# Patient Record
Sex: MALE | Race: WHITE | NOT HISPANIC OR LATINO | URBAN - METROPOLITAN AREA
[De-identification: names, ages, dates, MRNs, and addresses within clinical notes are randomized per-mention and may not be internally consistent; named-entity substitution may affect disease eponyms.]

---

## 2018-06-14 ENCOUNTER — HISTORICAL (OUTPATIENT)
Dept: RADIOLOGY | Facility: HOSPITAL | Age: 38
End: 2018-06-14

## 2019-04-25 ENCOUNTER — HISTORICAL (OUTPATIENT)
Dept: RADIOLOGY | Facility: HOSPITAL | Age: 39
End: 2019-04-25

## 2019-04-30 ENCOUNTER — HISTORICAL (OUTPATIENT)
Dept: SURGERY | Facility: HOSPITAL | Age: 39
End: 2019-04-30

## 2019-05-09 ENCOUNTER — HISTORICAL (OUTPATIENT)
Dept: ADMINISTRATIVE | Facility: HOSPITAL | Age: 39
End: 2019-05-09

## 2019-05-30 ENCOUNTER — HISTORICAL (OUTPATIENT)
Dept: ADMINISTRATIVE | Facility: HOSPITAL | Age: 39
End: 2019-05-30

## 2019-06-27 ENCOUNTER — HISTORICAL (OUTPATIENT)
Dept: ADMINISTRATIVE | Facility: HOSPITAL | Age: 39
End: 2019-06-27

## 2022-04-10 ENCOUNTER — HISTORICAL (OUTPATIENT)
Dept: ADMINISTRATIVE | Facility: HOSPITAL | Age: 42
End: 2022-04-10

## 2022-04-29 VITALS
BODY MASS INDEX: 35.64 KG/M2 | WEIGHT: 227.06 LBS | BODY MASS INDEX: 35.64 KG/M2 | BODY MASS INDEX: 35.64 KG/M2 | DIASTOLIC BLOOD PRESSURE: 94 MMHG | HEIGHT: 67 IN | HEIGHT: 67 IN | SYSTOLIC BLOOD PRESSURE: 138 MMHG | HEIGHT: 67 IN | HEIGHT: 67 IN | WEIGHT: 227.06 LBS | SYSTOLIC BLOOD PRESSURE: 167 MMHG | DIASTOLIC BLOOD PRESSURE: 80 MMHG | SYSTOLIC BLOOD PRESSURE: 138 MMHG | DIASTOLIC BLOOD PRESSURE: 93 MMHG | SYSTOLIC BLOOD PRESSURE: 150 MMHG | BODY MASS INDEX: 35.64 KG/M2 | DIASTOLIC BLOOD PRESSURE: 94 MMHG | WEIGHT: 227.06 LBS | WEIGHT: 227.06 LBS

## 2022-04-30 NOTE — OP NOTE
DATE OF SURGERY:    04/30/2019    SURGEON:  Bruno Amin MD    PREOPERATIVE DIAGNOSIS:  Right 4th metacarpal shaft fracture.    POSTOPERATIVE DIAGNOSIS:  Right 4th metacarpal shaft fracture.    PROCEDURE:    1. Open reduction/internal fixation, right 4th metacarpal shaft fracture.    2. Application of short-arm splint, right upper extremity.    ANESTHESIA:  LMA.    IV FLUIDS:  As per Anesthesia.    ESTIMATED BLOOD LOSS:  Less than 15 cc.    COUNTS:  Correct.    COMPLICATIONS:  None.    IMPLANTS:  Biomet 1.5 plate and screws.    DISPOSITION:  Stable to PACU.    INDICATION FOR PROCEDURE:  Mr. Arellano is a 38-year-old male who had trauma to his right hand.  He has been followed in my clinic.  Patient was noted to have a complete, slightly comminuted 4th metacarpal shaft fracture.  The risks, benefits, and alternatives were discussed with the patient in detail.  The risks included, but were not limited to, pain, bleeding, infection, damage to blood vessels or nerves, heart attack, stroke, death, need for operation, continued pain, continued loss of motion, malunion, nonunion, painful hardware, hardware failure, wound healing complications, need for additional surgery.  The patient understood these risks and wanted to proceed with surgical intervention.  Informed consent was obtained.    PROCEDURE IN DETAIL:  The patient was found in preoperative holding by Anesthesia and found fit for surgery.  He was taken to the operating room and placed on the operating table in supine position.  All bony prominences were well padded.  Timeout was called to identify correct patient, correct procedure, and correct site, and all were in agreement.  The patient underwent LMA anesthesia without complications.  He was then prepped and draped in normal sterile fashion, leaving the right upper extremity exposed for surgery.  A well padded tourniquet was placed on the right upper arm.  Approximate tourniquet time was 30 minutes.  At 2:50,  he received his preoperative antibiotics.       After exsanguination of the right upper extremity, attention was turned to the dorsal aspect of the right wrist.  With multiple views of the mini C-arm, correct starting point was then made.  A 6 cm incision was made over the dorsal aspect of the right wrist in line with the 4th metacarpal.  Soft tissue dissection was carried down, careful not to damage the neurovascular or tendinous structures.  Next, the fracture was identified.  It was curetted of all soft tissue debris.  It was then anatomically reduced, confirmed on multiple views of the mini C-arm.  Next, 3 cortical screws were placed proximal, 3 cortical screws were placed distal to the fracture with a 6-hole 1.5 plate by Biomet compressing the fracture.  Multiple views of the mini C-arm found the fracture well reduced and the hardware in appropriate position.  Tourniquet was released.  Hemostasis achieved.  Copious irrigation was used to wash the wound.  The subcutaneous tissue was closed with 3-0 Vicryl.  Skin was closed with 3-0 nylon sutures.  Xeroform, 4 x 4s, and a well-padded posterior splint were placed on the right upper extremity.  He was awoken by Anesthesia and brought to PACU in stable condition.        ______________________________  MD BLANQUITA Quevedo/  DD:  04/30/2019  Time:  03:06PM  DT:  04/30/2019  Time:  03:20PM  Job #:  [id number]

## 2022-05-02 NOTE — HISTORICAL [facility]ER
This is a historical note converted from Nick. Formatting and pictures may have been removed.  Please reference Nick for original formatting and attached multimedia. Chief Complaint  ORIF right 4th Metacarpal fx-sx 4/30/19-glb 7/29/19  History of Present Illness  Patient returns today for repeat exam. ?Patient is 4 weeks from his open reduction internal fixation of the fourth metacarpal fracture. ?He states he is doing better. ?He has some swelling over his hand otherwise he states?the pain is very minimal. ?He is able to make a full fist now.? He denies any numbness or tingling.  Physical Exam  Vitals & Measurements  T:?98.5? ?F (Oral)? HR:?100(Peripheral)? BP:?138/94?  HT:?169?cm? WT:?103?kg? BMI:?36.06?  Right ankle warm soft and warm. ?Skin is intact with no signs or symptoms of DVT or infection his incisions well-healed he does have some fullness, suspected scar tissue over the dorsal aspect of the hand there is no fluid there is no erythema. ?He is now able make a full fist full extension. ?He is neurovascular intact distally x-rays 3 views the right hand?demonstrates?well aligned fourth and fifth metacarpal shaft fracture with callus formation, hardware in appropriate position.  Assessment/Plan  1.?Fracture of fifth metacarpal bone of right hand?S62.306A  ? At this time we discussed his physical exam and x-ray findings. ?He is healing nicely. ?We have discussed hand therapy to regain his strength and motion.? He will start doing this on his own. ?We have discussed his return to work, we will hold off on this over the next?4 weeks. ?I would like to see him back in 4 weeks to see how he is progressing.  Ordered:  Clinic Follow up, *Est. 06/27/19 3:00:00 CDT, Order for future visit, Fracture of fifth metacarpal bone of right hand  Fracture of shaft of fourth metacarpal bone of right hand, [facility]  Post-Op follow-up visit [id number] PC, Fracture of fifth metacarpal bone of right hand  Fracture of shaft of  fourth metacarpal bone of right hand, [facility], 05/30/19 8:21:00 CDT  ?  2.?Fracture of shaft of fourth metacarpal bone of right hand?S62.324A  Ordered:  Clinic Follow up, *Est. 06/27/19 3:00:00 CDT, Order for future visit, Fracture of fifth metacarpal bone of right hand  Fracture of shaft of fourth metacarpal bone of right hand, [facility]  Post-Op follow-up visit [id number] PC, Fracture of fifth metacarpal bone of right hand  Fracture of shaft of fourth metacarpal bone of right hand, [facility], 05/30/19 8:21:00 CDT  ?  Referrals  Clinic Follow up, *Est. 06/27/19 3:00:00 CDT, Order for future visit, Fracture of fifth metacarpal bone of right hand  Fracture of shaft of fourth metacarpal bone of right hand, [facility]   Problem List/Past Medical History  Ongoing  Obesity  Historical  No qualifying data  Procedure/Surgical History  Open treatment of metacarpal fracture, single, includes internal fixation, when performed, each bone (04/30/2019)  ORIF Metacarpal (Right) (04/30/2019)  Reposition Right Metacarpal with Internal Fixation Device, Open Approach (04/30/2019)  oral surgery   Medications  acetaminophen-hydrocodone 325 mg-5 mg oral tablet, 1 tab(s), Oral, q4-6hr,? ?Not taking: Pt no longer taking  Norco 7.5 mg-325 mg oral tablet, 1 tab(s), Oral, q6hr, PRN,? ?Not taking: Pt no longer taking Last Dose Date/Time Unknown  Allergies  No Known Medication Allergies  Social History  Alcohol - Medium Risk, 11/21/2015  Current, Beer, 1-2 times per week, 11/21/2015  Employment/School  Employed, 04/29/2019  Exercise  Home/Environment  Lives with Alone. Living situation: Home/Independent., 04/29/2019  Nutrition/Health  Regular, 04/29/2019  Substance Abuse - Denies Substance Abuse, 11/21/2015  Tobacco - Medium Risk, 11/21/2015  10 or more cigarettes (1/2 pack or more)/day in last 30 days, No, 05/30/2019  Family History  Cardiac arrest.: Father.  Immunizations  Vaccine Date Status    tetanus/diphtheria/pertussis, acel(Tdap) 11/21/2015 Given   Health Maintenance  Health Maintenance  ???Pending?(in the next year)  ??? ??OverDue  ??? ? ? ?Alcohol Misuse Screening due??01/01/19??and every 1??year(s)  ??? ??Due?  ??? ? ? ?ADL Screening due??05/30/19??and every 1??year(s)  ??? ? ? ?Depression Screening due??05/30/19??and every?  ??? ??Due In Future?  ??? ? ? ?Obesity Screening not due until??01/01/20??and every 1??year(s)  ??? ? ? ?Blood Pressure Screening not due until??05/08/20??and every 1??year(s)  ??? ? ? ?Body Mass Index Check not due until??05/08/20??and every 1??year(s)  ???Satisfied?(in the past 1 year)  ??? ??Satisfied?  ??? ? ? ?Blood Pressure Screening on??05/30/19.??Satisfied by Asia Vincent LPN  ??? ? ? ?Body Mass Index Check on??05/30/19.??Satisfied by Asia Vincent LPN  ??? ? ? ?Obesity Screening on??05/30/19.??Satisfied by Asia Vincent LPN  ?  ?

## 2022-05-02 NOTE — HISTORICAL [facility]
This is a historical note converted from Nick. Formatting and pictures may have been removed.  Please reference Nick for original formatting and attached multimedia. Chief Complaint  ORIF right 4th metacarpal fx. Sx 4/30/19 Gl 7/29/19. Patient states hes doing good. Denies any pain.  History of Present Illness  Patient returns today for repeat exam. ?Patient is a possibly 2 months from his?open reduction internal fixation of the fourth metacarpal fracture. ?Patient states he is doing well having?very minimal, no pain. ?He states he feels safe returning to work.  Physical Exam  Vitals & Measurements  HR:?100(Peripheral)? BP:?138/94?  HT:?169?cm? WT:?103?kg? BMI:?36.06?  Right hand warm soft and warm. ?Skin is intact with no signs or symptoms of DVT or infection his incision is healed he does have some palpable callus,?he is able to make a full fist has full extension he is nontender over his fracture site he is neurovascular intact distally. ?X-rays 3 views right hand?demonstrate a healed fourth and fifth metacarpal fracture with hardware in appropriate position.  Assessment/Plan  1.?Fracture of fifth metacarpal bone of right hand?S62.306A  ?At this time we discussed his physical exam and x-ray findings. ?He is healed nicely. ?I am okay with him returning to work?on Monday of next week. ?In the meantime he will continue weightbearing as tolerated. ?Patient understands to call or return sooner if there is any new problems or difficulties.  Ordered:  Post-Op follow-up visit [id number] PC, Fracture of fifth metacarpal bone of right hand  Fracture of shaft of fourth metacarpal bone of right hand, [facility], 06/27/19 8:04:00 CDT  ?  2.?Fracture of shaft of fourth metacarpal bone of right hand?S62.324A  Ordered:  Post-Op follow-up visit [id number] PC, Fracture of fifth metacarpal bone of right hand  Fracture of shaft of fourth metacarpal bone of right hand, [facility], 06/27/19 8:04:00  CDT  ?  Orders:  Clinic Follow-up PRN, 06/27/19 8:04:00 CDT, Future Order, [facility]  Referrals  Clinic Follow-up PRN, 06/27/19 8:04:00 CDT, Future Order, [facility]   Problem List/Past Medical History  Ongoing  Obesity  Historical  No qualifying data  Procedure/Surgical History  Open treatment of metacarpal fracture, single, includes internal fixation, when performed, each bone (04/30/2019)  ORIF Metacarpal (Right) (04/30/2019)  Reposition Right Metacarpal with Internal Fixation Device, Open Approach (04/30/2019)  oral surgery   Medications  acetaminophen-hydrocodone 325 mg-5 mg oral tablet, 1 tab(s), Oral, q4-6hr,? ?Not taking: Pt no longer taking  Norco 7.5 mg-325 mg oral tablet, 1 tab(s), Oral, q6hr, PRN,? ?Not taking: Pt no longer taking Last Dose Date/Time Unknown  Allergies  No Known Medication Allergies  Social History  Alcohol - Medium Risk, 11/21/2015  Current, Beer, 1-2 times per week, 11/21/2015  Employment/School  Employed, 04/29/2019  Exercise  Home/Environment  Lives with Alone. Living situation: Home/Independent., 04/29/2019  Nutrition/Health  Regular, 04/29/2019  Substance Use - Denies Substance Abuse, 11/21/2015  Tobacco - Medium Risk, 11/21/2015  10 or more cigarettes (1/2 pack or more)/day in last 30 days, No, 06/27/2019  Family History  Cardiac arrest.: Father.  Immunizations  Vaccine Date Status   tetanus/diphtheria/pertussis, acel(Tdap) 11/21/2015 Given   Health Maintenance  Health Maintenance  ???Pending?(in the next year)  ??? ??OverDue  ??? ? ? ?Alcohol Misuse Screening due??01/01/19??and every 1??year(s)  ??? ??Due?  ??? ? ? ?ADL Screening due??06/27/19??and every 1??year(s)  ??? ? ? ?Depression Screening due??06/27/19??and every?  ??? ??Due In Future?  ??? ? ? ?Obesity Screening not due until??01/01/20??and every 1??year(s)  ??? ? ? ?Blood Pressure Screening not due until??06/26/20??and every 1??year(s)  ??? ? ? ?Body Mass Index Check not due until??06/26/20??and every  1??year(s)  ???Satisfied?(in the past 1 year)  ??? ??Satisfied?  ??? ? ? ?Blood Pressure Screening on??06/27/19.??Satisfied by Nette Domínguez  ??? ? ? ?Body Mass Index Check on??06/27/19.??Satisfied by Nette Domínguez  ??? ? ? ?Obesity Screening on??06/27/19.??Satisfied by Nette Domínguez  ?

## 2022-05-02 NOTE — HISTORICAL [facility]
This is a historical note converted from Nick. Formatting and pictures may have been removed.  Please reference Nick for original formatting and attached multimedia. Chief Complaint  ORIF right 4th Metacarpal fx; sx 4/30/19-glb 7/29/19  History of Present Illness  Patient returns today for his first postop visit. ?Patient states his right hand is stiff though improving. ?He states the pain is improving.  Physical Exam  Vitals & Measurements  HR:?82(Peripheral)? BP:?167/93?  HT:?169?cm? WT:?103?kg? BMI:?36.06?  Right hand warm soft and warm. ?Skin is intact with no signs or symptoms of DVT or infection his incision well-healed his sutures have removed. ?He does have stiffness of the MCP joints. ?He has full extension he is stable to stressing he is neurovascular intact distally. ?X-rays 3 views of the right hand?demonstrate a well aligned?fourth and fifth metacarpal fracture with hardware in appropriate position of his fourth metacarpal.  Assessment/Plan  1.?Fracture of shaft of fourth metacarpal bone of right hand?S62.324A  ? At this time we discussed his physical exam and x-ray findings. ?He is healing nicely. ?We have discussed the importance of finger and hand motion. ?He will refrain from any lifting. ?We have discussed a removable Velcro splint. ?We have discussed some hand exercises. ?I would like to see him back in 4 weeks to see how he is progressing. ?He will hold off on his job duties at this time.  Ordered:  Clinic Follow up, *Est. 05/30/19 8:30:00 CDT, Order for future visit, Fracture of shaft of fourth metacarpal bone of right hand, [facility]  [employer], 05/09/19 8:57:00 CDT, removable wrist and forearm splint, [id number], Fracture of shaft of fourth metacarpal bone of right hand  Fracture of fifth metacarpal bone of right hand  Post-Op follow-up visit [id number] PC, Fracture of shaft of fourth metacarpal bone of right hand  Fracture of fifth metacarpal bone of right hand,  [facility], 05/09/19 8:44:00 CDT  ?  2.?Fracture of fifth metacarpal bone of right hand?S62.306A  Ordered:  Clinic Follow up, *Est. 05/30/19 8:30:00 CDT, Order for future visit, Fracture of shaft of fourth metacarpal bone of right hand, [facility]  [employer], 05/09/19 8:57:00 CDT, removable wrist and forearm splint, [id number], Fracture of shaft of fourth metacarpal bone of right hand  Fracture of fifth metacarpal bone of right hand  Post-Op follow-up visit [id number] PC, Fracture of shaft of fourth metacarpal bone of right hand  Fracture of fifth metacarpal bone of right hand, [facility], 05/09/19 8:44:00 CDT  ?  Referrals  Clinic Follow up, *Est. 05/30/19 8:30:00 CDT, Order for future visit, Fracture of shaft of fourth metacarpal bone of right hand, [facility]   Problem List/Past Medical History  Ongoing  Obesity  Historical  No qualifying data  Procedure/Surgical History  Open treatment of metacarpal fracture, single, includes internal fixation, when performed, each bone (04/30/2019)  ORIF Metacarpal (Right) (04/30/2019)  Reposition Right Metacarpal with Internal Fixation Device, Open Approach (04/30/2019)  oral surgery   Medications  acetaminophen-hydrocodone 325 mg-5 mg oral tablet, 1 tab(s), Oral, q4-6hr,? ?Not taking: Pt no longer taking  Norco 7.5 mg-325 mg oral tablet, 1 tab(s), Oral, q6hr, PRN  Allergies  No Known Medication Allergies  Social History  Alcohol - Medium Risk, 11/21/2015  Current, Beer, 1-2 times per week, 11/21/2015  Employment/School  Employed, 04/29/2019  Exercise  Home/Environment  Lives with Alone. Living situation: Home/Independent., 04/29/2019  Nutrition/Health  Regular, 04/29/2019  Substance Abuse - Denies Substance Abuse, 11/21/2015  Tobacco - Medium Risk, 11/21/2015  10 or more cigarettes (1/2 pack or more)/day in last 30 days, No, 05/09/2019  Family History  Cardiac arrest.: Father.  Immunizations  Vaccine Date Status    tetanus/diphtheria/pertussis, acel(Tdap) 11/21/2015 Given   Health Maintenance  Health Maintenance  ???Pending?(in the next year)  ??? ??OverDue  ??? ? ? ?Alcohol Misuse Screening due??01/01/19??and every 1??year(s)  ??? ??Due?  ??? ? ? ?ADL Screening due??05/09/19??and every 1??year(s)  ??? ? ? ?Depression Screening due??05/09/19??and every?  ??? ??Due In Future?  ??? ? ? ?Obesity Screening not due until??01/01/20??and every 1??year(s)  ??? ? ? ?Blood Pressure Screening not due until??05/08/20??and every 1??year(s)  ??? ? ? ?Body Mass Index Check not due until??05/08/20??and every 1??year(s)  ???Satisfied?(in the past 1 year)  ??? ??Satisfied?  ??? ? ? ?Blood Pressure Screening on??05/09/19.??Satisfied by Asia Vincent LPN  ??? ? ? ?Body Mass Index Check on??05/09/19.??Satisfied by Asia Vincent LPN  ??? ? ? ?Obesity Screening on??05/09/19.??Satisfied by Asia Vincent LPN  ?  ?

## 2023-11-07 ENCOUNTER — HOSPITAL ENCOUNTER (EMERGENCY)
Facility: HOSPITAL | Age: 43
Discharge: HOME | End: 2023-11-07
Attending: STUDENT IN AN ORGANIZED HEALTH CARE EDUCATION/TRAINING PROGRAM
Payer: COMMERCIAL

## 2023-11-07 VITALS
WEIGHT: 210 LBS | HEIGHT: 67 IN | TEMPERATURE: 98 F | OXYGEN SATURATION: 97 % | HEART RATE: 86 BPM | BODY MASS INDEX: 32.96 KG/M2 | DIASTOLIC BLOOD PRESSURE: 100 MMHG | RESPIRATION RATE: 18 BRPM | SYSTOLIC BLOOD PRESSURE: 156 MMHG

## 2023-11-07 DIAGNOSIS — S01.81XA FACIAL LACERATION, INITIAL ENCOUNTER: ICD-10-CM

## 2023-11-07 DIAGNOSIS — S02.2XXA CLOSED FRACTURE OF NASAL BONE, INITIAL ENCOUNTER: Primary | ICD-10-CM

## 2023-11-07 DIAGNOSIS — S02.31XA CLOSED FRACTURE OF RIGHT ORBITAL FLOOR, INITIAL ENCOUNTER: ICD-10-CM

## 2023-11-07 PROCEDURE — 12011 RPR F/E/E/N/L/M 2.5 CM/<: CPT

## 2023-11-07 PROCEDURE — 99285 EMERGENCY DEPT VISIT HI MDM: CPT | Mod: 25

## 2023-11-07 PROCEDURE — 90471 IMMUNIZATION ADMIN: CPT | Performed by: STUDENT IN AN ORGANIZED HEALTH CARE EDUCATION/TRAINING PROGRAM

## 2023-11-07 PROCEDURE — 25000003 PHARM REV CODE 250: Performed by: STUDENT IN AN ORGANIZED HEALTH CARE EDUCATION/TRAINING PROGRAM

## 2023-11-07 PROCEDURE — 63600175 PHARM REV CODE 636 W HCPCS: Performed by: STUDENT IN AN ORGANIZED HEALTH CARE EDUCATION/TRAINING PROGRAM

## 2023-11-07 PROCEDURE — 90715 TDAP VACCINE 7 YRS/> IM: CPT | Performed by: STUDENT IN AN ORGANIZED HEALTH CARE EDUCATION/TRAINING PROGRAM

## 2023-11-07 RX ORDER — LIDOCAINE HYDROCHLORIDE 10 MG/ML
5 INJECTION INFILTRATION; PERINEURAL
Status: COMPLETED | OUTPATIENT
Start: 2023-11-07 | End: 2023-11-07

## 2023-11-07 RX ORDER — HYDROCODONE BITARTRATE AND ACETAMINOPHEN 10; 325 MG/1; MG/1
1 TABLET ORAL
Status: COMPLETED | OUTPATIENT
Start: 2023-11-07 | End: 2023-11-07

## 2023-11-07 RX ORDER — TRAMADOL HYDROCHLORIDE 50 MG/1
50 TABLET ORAL EVERY 6 HOURS PRN
Qty: 12 TABLET | Refills: 0 | Status: SHIPPED | OUTPATIENT
Start: 2023-11-07

## 2023-11-07 RX ORDER — AMOXICILLIN AND CLAVULANATE POTASSIUM 875; 125 MG/1; MG/1
1 TABLET, FILM COATED ORAL 2 TIMES DAILY
Qty: 14 TABLET | Refills: 0 | Status: SHIPPED | OUTPATIENT
Start: 2023-11-07

## 2023-11-07 RX ADMIN — LIDOCAINE HYDROCHLORIDE 5 ML: 10 INJECTION, SOLUTION INFILTRATION; PERINEURAL at 08:11

## 2023-11-07 RX ADMIN — HYDROCODONE BITARTRATE AND ACETAMINOPHEN 1 TABLET: 10; 325 TABLET ORAL at 08:11

## 2023-11-07 RX ADMIN — TETANUS TOXOID, REDUCED DIPHTHERIA TOXOID AND ACELLULAR PERTUSSIS VACCINE, ADSORBED 0.5 ML: 5; 2.5; 8; 8; 2.5 SUSPENSION INTRAMUSCULAR at 07:11
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
